# Patient Record
Sex: FEMALE | Race: WHITE
[De-identification: names, ages, dates, MRNs, and addresses within clinical notes are randomized per-mention and may not be internally consistent; named-entity substitution may affect disease eponyms.]

---

## 2018-06-01 ENCOUNTER — HOSPITAL ENCOUNTER (OUTPATIENT)
Dept: HOSPITAL 17 - CPRE | Age: 38
End: 2018-06-01
Attending: OBSTETRICS & GYNECOLOGY
Payer: COMMERCIAL

## 2018-06-01 DIAGNOSIS — N80.9: ICD-10-CM

## 2018-06-01 DIAGNOSIS — Z01.812: Primary | ICD-10-CM

## 2018-06-01 DIAGNOSIS — R10.2: ICD-10-CM

## 2018-06-01 LAB
BACTERIA #/AREA URNS HPF: (no result) /HPF
BASOPHILS # BLD AUTO: 0 TH/MM3 (ref 0–0.2)
BASOPHILS NFR BLD: 0.2 % (ref 0–2)
CHLORIDE SERPL-SCNC: 104 MEQ/L (ref 98–107)
COLOR UR: YELLOW
EOSINOPHIL # BLD: 0.3 TH/MM3 (ref 0–0.4)
EOSINOPHIL NFR BLD: 2 % (ref 0–4)
ERYTHROCYTE [DISTWIDTH] IN BLOOD BY AUTOMATED COUNT: 14.1 % (ref 11.6–17.2)
GLUCOSE UR STRIP-MCNC: (no result) MG/DL
HCO3 BLD-SCNC: 27.4 MEQ/L (ref 21–32)
HCT VFR BLD CALC: 39.2 % (ref 35–46)
HGB BLD-MCNC: 12.8 GM/DL (ref 11.6–15.3)
HGB UR QL STRIP: (no result)
KETONES UR STRIP-MCNC: (no result) MG/DL
LYMPHOCYTES # BLD AUTO: 4.7 TH/MM3 (ref 1–4.8)
LYMPHOCYTES NFR BLD AUTO: 31.8 % (ref 9–44)
MCH RBC QN AUTO: 28.2 PG (ref 27–34)
MCHC RBC AUTO-ENTMCNC: 32.7 % (ref 32–36)
MCV RBC AUTO: 86.3 FL (ref 80–100)
MONOCYTE #: 0.7 TH/MM3 (ref 0–0.9)
MONOCYTES NFR BLD: 4.7 % (ref 0–8)
MUCOUS THREADS #/AREA URNS LPF: (no result) /LPF
NEUTROPHILS # BLD AUTO: 9.1 TH/MM3 (ref 1.8–7.7)
NEUTROPHILS NFR BLD AUTO: 61.3 % (ref 16–70)
NITRITE UR QL STRIP: (no result)
PLATELET # BLD: 438 TH/MM3 (ref 150–450)
PMV BLD AUTO: 6.6 FL (ref 7–11)
RBC # BLD AUTO: 4.54 MIL/MM3 (ref 4–5.3)
SODIUM SERPL-SCNC: 140 MEQ/L (ref 136–145)
SP GR UR STRIP: 1.03 (ref 1–1.03)
SQUAMOUS #/AREA URNS HPF: 21 /HPF (ref 0–5)
URINE LEUKOCYTE ESTERASE: (no result)
WBC # BLD AUTO: 14.9 TH/MM3 (ref 4–11)

## 2018-06-01 PROCEDURE — 85025 COMPLETE CBC W/AUTO DIFF WBC: CPT

## 2018-06-01 PROCEDURE — 81001 URINALYSIS AUTO W/SCOPE: CPT

## 2018-06-01 PROCEDURE — 36415 COLL VENOUS BLD VENIPUNCTURE: CPT

## 2018-06-01 PROCEDURE — 80051 ELECTROLYTE PANEL: CPT

## 2018-06-06 ENCOUNTER — HOSPITAL ENCOUNTER (OUTPATIENT)
Dept: HOSPITAL 17 - HSDC | Age: 38
Discharge: HOME | End: 2018-06-06
Attending: OBSTETRICS & GYNECOLOGY
Payer: COMMERCIAL

## 2018-06-06 VITALS
DIASTOLIC BLOOD PRESSURE: 65 MMHG | OXYGEN SATURATION: 97 % | TEMPERATURE: 96.7 F | RESPIRATION RATE: 16 BRPM | HEART RATE: 98 BPM | SYSTOLIC BLOOD PRESSURE: 104 MMHG

## 2018-06-06 VITALS — BODY MASS INDEX: 24.89 KG/M2 | WEIGHT: 164.24 LBS | HEIGHT: 68 IN

## 2018-06-06 DIAGNOSIS — G89.29: ICD-10-CM

## 2018-06-06 DIAGNOSIS — N80.0: Primary | ICD-10-CM

## 2018-06-06 DIAGNOSIS — N83.12: ICD-10-CM

## 2018-06-06 DIAGNOSIS — N83.8: ICD-10-CM

## 2018-06-06 DIAGNOSIS — N94.6: ICD-10-CM

## 2018-06-06 DIAGNOSIS — R10.2: ICD-10-CM

## 2018-06-06 PROCEDURE — 00840 ANES IPER PX LOWER ABD NOS: CPT

## 2018-06-06 PROCEDURE — 58552 LAPARO-VAG HYST INCL T/O: CPT

## 2018-06-06 PROCEDURE — 88307 TISSUE EXAM BY PATHOLOGIST: CPT

## 2018-06-06 NOTE — MP
cc:

Gerard Batista MD

****

 

 

DATE OF OPERATION:

06/06/2018

 

DATE OF PROCEDURE:

06/06/2018.

 

PREOPERATIVE DIAGNOSES:

The patient with chronic pelvic pain, dysmenorrhea, history of 

endometriosis.

 

PROCEDURE PERFORMED:

Operative laparoscopy with robotic-assisted total laparoscopic 

hysterectomy, bilateral salpingectomy, left oophorectomy, lysis of 

adhesions.

 

POSTOPERATIVE DIAGNOSIS.

Chronic pelvic pain, dysmenorrhea, history of endometriosis.

 

SURGEON:

Gerard Batista MD

 

ANESTHESIA:

General by endotracheal intubation.

 

ESTIMATED BLOOD LOSS:

Less than 50 mL.

 

DRAINS:

Giron to gravity.

 

OPERATIVE FINDINGS:

The patient had a normal-sized uterus that was mobile.  There was 

evidence of peritoneal implants over the anterior peritoneal surface 

and in the cul-de-sac and under the left uterosacral ligament.  The 

left ovary was adherent to the sidewall.  The right ovary was normal 

in size, shape and appearance and free and devoid of any surface 

lesions.

 

INDICATIONS FOR PROCEDURE:

The patient with chronic debilitating pelvic pain secondary to 

endometriosis, failed conservative management.  After discussion of 

therapeutic options, the patient elected for hysterectomy, also 

intraoperative evaluation of the left ovary.  The patient's pain is 

predominantly on the left side and the patient elected to have her 

left ovary removed at the time of her procedure.  The patient received

Ancef 2 grams prophylactically.

 

PROCEDURE DESCRIPTION:

The patient was taken to the operating room under stable condition, 

underwent general anesthesia with endotracheal intubation.  She was 

carefully positioned in dorsal lithotomy position using Kristopher stirrups

on the lower extremities.  She had sequentials placed for VTE 

prophylaxis.  She was prepped and draped.  Timeout was conducted and 

agreed on by all present in the room.  A Giron catheter was inserted 

by sterile technique.  The patient was then examined.  The cervix was 

small.  A medium VCare device was utilized and inserted through the 

cervical os without difficulty.  The cervix was dilated to accommodate

the VCare without complication or perforation.  After securing the 

VCare, all instruments were removed and gloves were changed.  The 

abdomen was examined.  The patient had a large tattoo below the 

umbilicus extending to the pubic symphysis and to each right and left 

flank.  Otherwise, no discernible pathology or lesions were noted.

 

The umbilical port site was chosen first injecting with 0.25% plain 

Marcaine and then using a #5 trocar placed through a small incision 

with direct entry into the peritoneal cavity without complication.  

Insufflation of CO2 was conducted at low pressure and then the patient

was placed in Trendelenburg positioning to visualize the pelvic 

anatomy.  The laparoscopic ports were placed using 8 mm da Kavin 

ports, placing them strategically to avoid interruption of the tattoo.

 These were placed under direct vision, again injecting the incision 

site with 0.25% plain Marcaine.  The umbilical port was then traded to

a #12 camera port.  The da Kavin patient's cart was side-docked with 

the #2 arm on the left and the #1 and #3 on the right.  Monopolar 

scissors were attached to two and a bipolar fenestrated grasper and a 

ProGrasp were placed in the #1 and #3 respectively.  Good articulation

was noted.  There were no collisions.  Attention was then directed to 

the surgeon's cart where visualization of the pelvic anatomy was done.

 After confirming abnormalities of the left ovary and cul-de-sac, 

decision was to proceed with removal of the left tube and ovary.

 

The round ligament was divided on the left, allowing dissection of the

retroperitoneal space and then opening the broad ligament to the 

contralateral side over the bladder reflection and then laterally to 

evaluate the infundibulopelvic vessels.  The ureter was easily 

visualized and skeletonized out of the operative field.  The 

infundibulopelvic vessels were then secured hemostatically.  Pedicle 

was cut and then dissection of the uterine artery and vein on the left

side was accomplished without difficulty.  Good hemostasis was 

secured.  After securing the pedicle the contralateral side was then 

dissected and then decision was to leave the right ovary.  The 

fallopian tube was removed on the right, cauterizing the mesentery 

appropriately to control any bleeding and then this allowed removal of

the fallopian tube intact with the uterus.  The uteroovarian pedicle 

was skeletonized, ligated opening the broad ligament leaf posteriorly 

and anteriorly, identifying the uterine artery and vein on the right 

was accomplished without difficulty and then the pedicle secured and 

made hemostatic with bipolar energy.  Once the vessels were secured, a

colpotomy incision was made posteriorly allowing removal of the cervix

with the uterus after completing the circumferential colpotomy 

incision.  Good result was noted.  No active bleeding or hematoma was 

incurred.  The uterus was then retrieved and removed through the 

vaginal opening by the assistant and this included both fallopian 

tubes, left ovary and cervix with the uterus.

 

The vaginal cuff was then closed with a number 1 Stratafix suture, 

which was introduced through the vaginal opening.  The #2 arm was 

traded to a Gurdeep-Cut suture needle  and then closure of the cuff 

was accomplished without complication with good integrity demonstrated

by the assistant after placing a sponge stick vaginally and providing 

pressure in a cephalad direction.  Once this was complete, the suture 

needle was trimmed flush with the peritoneum and the suture needle was

secured.  In addition to placement of the #1 Stratafix suture, a 2-0 

Vicryl suture was introduced, securing the right ovary to the 

sidewall.  The ovary had a very prominent pedicle and concerns for 

torsion were noted.  The ovary was secured to the round ligament with 

a simple figure-of-eight suture of the 2-0 Vicryl with good result.  

No bleeding, no hematoma was incurred.  After trimming the suture ends

and securing the needle, observation of the dissection sites were dry 

and at this point, the da Kavin patient's cart was undocked.  Straight

laparoscopy was used to retrieve the suture needles and then 

irrigation of the pelvis was accomplished without difficulty.  Copious

irrigation was used to clean the pelvis.  Observation off pressure 

revealed no active bleeding or hematoma.  Both ureters were visualized

throughout and peristalsing normally, draining clear urine into the 

Giron bag.

 

After completion of the procedure, the umbilical port site was closed 

using a CrossBow with a #1 Vicryl suture, closing the fascia with good

integrity.  At the completion of the case, the pneumoperitoneum was 

decompressed under direct vision.  The trocars were all removed 

without complication.  Additional 0.25% plain Marcaine was injected 

into all incision sites for postoperative pain management.  A 4-0 

Monocryl suture was used to close the skin incisions with subcuticular

method and then Steri-Strips and Band-Aids were placed over the 

incision.  At the completion of the case, final counts were correct.  

The patient was stable.  She was taken to the recovery room on room 

air.

 

 

__________________________________

MD LINO Sosa/VIKI

D: 06/06/2018, 10:00 AM

T: 06/06/2018, 10:31 AM

Visit #: [id number]

Job #: [id number]

## 2018-06-09 ENCOUNTER — HOSPITAL ENCOUNTER (INPATIENT)
Dept: HOSPITAL 17 - NEPE | Age: 38
LOS: 2 days | Discharge: HOME | DRG: 863 | End: 2018-06-11
Attending: OBSTETRICS & GYNECOLOGY | Admitting: OBSTETRICS & GYNECOLOGY
Payer: COMMERCIAL

## 2018-06-09 VITALS
OXYGEN SATURATION: 98 % | RESPIRATION RATE: 20 BRPM | HEART RATE: 112 BPM | SYSTOLIC BLOOD PRESSURE: 96 MMHG | TEMPERATURE: 98.7 F | DIASTOLIC BLOOD PRESSURE: 51 MMHG

## 2018-06-09 VITALS
OXYGEN SATURATION: 99 % | DIASTOLIC BLOOD PRESSURE: 78 MMHG | SYSTOLIC BLOOD PRESSURE: 137 MMHG | HEART RATE: 99 BPM | RESPIRATION RATE: 17 BRPM

## 2018-06-09 VITALS
HEART RATE: 115 BPM | OXYGEN SATURATION: 98 % | SYSTOLIC BLOOD PRESSURE: 117 MMHG | RESPIRATION RATE: 18 BRPM | DIASTOLIC BLOOD PRESSURE: 59 MMHG | TEMPERATURE: 99.2 F

## 2018-06-09 VITALS
RESPIRATION RATE: 18 BRPM | OXYGEN SATURATION: 99 % | HEART RATE: 98 BPM | SYSTOLIC BLOOD PRESSURE: 127 MMHG | DIASTOLIC BLOOD PRESSURE: 62 MMHG

## 2018-06-09 VITALS — TEMPERATURE: 97.3 F

## 2018-06-09 VITALS
HEART RATE: 90 BPM | DIASTOLIC BLOOD PRESSURE: 81 MMHG | OXYGEN SATURATION: 96 % | SYSTOLIC BLOOD PRESSURE: 114 MMHG | RESPIRATION RATE: 18 BRPM | TEMPERATURE: 99 F

## 2018-06-09 VITALS
HEART RATE: 93 BPM | DIASTOLIC BLOOD PRESSURE: 51 MMHG | OXYGEN SATURATION: 97 % | TEMPERATURE: 98.6 F | RESPIRATION RATE: 17 BRPM | SYSTOLIC BLOOD PRESSURE: 105 MMHG

## 2018-06-09 DIAGNOSIS — N73.0: ICD-10-CM

## 2018-06-09 DIAGNOSIS — Z72.0: ICD-10-CM

## 2018-06-09 DIAGNOSIS — Z90.710: ICD-10-CM

## 2018-06-09 DIAGNOSIS — K59.00: ICD-10-CM

## 2018-06-09 DIAGNOSIS — T81.4XXA: Primary | ICD-10-CM

## 2018-06-09 DIAGNOSIS — F31.9: ICD-10-CM

## 2018-06-09 LAB
ALBUMIN SERPL-MCNC: 3 GM/DL (ref 3.4–5)
ALP SERPL-CCNC: 87 U/L (ref 45–117)
ALT SERPL-CCNC: 33 U/L (ref 10–53)
AST SERPL-CCNC: 15 U/L (ref 15–37)
BASOPHILS # BLD AUTO: 0 TH/MM3 (ref 0–0.2)
BASOPHILS NFR BLD: 0.1 % (ref 0–2)
BILIRUB SERPL-MCNC: 0.7 MG/DL (ref 0.2–1)
BUN SERPL-MCNC: 8 MG/DL (ref 7–18)
CALCIUM SERPL-MCNC: 8.4 MG/DL (ref 8.5–10.1)
CHLORIDE SERPL-SCNC: 101 MEQ/L (ref 98–107)
COLOR UR: YELLOW
CREAT SERPL-MCNC: 0.58 MG/DL (ref 0.5–1)
EOSINOPHIL # BLD: 0.1 TH/MM3 (ref 0–0.4)
EOSINOPHIL NFR BLD: 0.5 % (ref 0–4)
ERYTHROCYTE [DISTWIDTH] IN BLOOD BY AUTOMATED COUNT: 14.1 % (ref 11.6–17.2)
GFR SERPLBLD BASED ON 1.73 SQ M-ARVRAT: 118 ML/MIN (ref 89–?)
GLUCOSE SERPL-MCNC: 96 MG/DL (ref 74–106)
GLUCOSE UR STRIP-MCNC: (no result) MG/DL
HCO3 BLD-SCNC: 25.2 MEQ/L (ref 21–32)
HCT VFR BLD CALC: 33.8 % (ref 35–46)
HGB BLD-MCNC: 11 GM/DL (ref 11.6–15.3)
HGB UR QL STRIP: (no result)
INR PPP: 1 RATIO
KETONES UR STRIP-MCNC: (no result) MG/DL
LYMPHOCYTES # BLD AUTO: 1.3 TH/MM3 (ref 1–4.8)
LYMPHOCYTES NFR BLD AUTO: 8 % (ref 9–44)
MCH RBC QN AUTO: 28.1 PG (ref 27–34)
MCHC RBC AUTO-ENTMCNC: 32.5 % (ref 32–36)
MCV RBC AUTO: 86.5 FL (ref 80–100)
MONOCYTE #: 0.9 TH/MM3 (ref 0–0.9)
MONOCYTES NFR BLD: 5.4 % (ref 0–8)
MUCOUS THREADS #/AREA URNS LPF: (no result) /LPF
NEUTROPHILS # BLD AUTO: 14 TH/MM3 (ref 1.8–7.7)
NEUTROPHILS NFR BLD AUTO: 86 % (ref 16–70)
NITRITE UR QL STRIP: (no result)
PLATELET # BLD: 353 TH/MM3 (ref 150–450)
PMV BLD AUTO: 7.2 FL (ref 7–11)
PROT SERPL-MCNC: 7 GM/DL (ref 6.4–8.2)
PROTHROMBIN TIME: 10 SEC (ref 9.8–11.6)
RBC # BLD AUTO: 3.91 MIL/MM3 (ref 4–5.3)
SODIUM SERPL-SCNC: 137 MEQ/L (ref 136–145)
SP GR UR STRIP: 1.02 (ref 1–1.03)
SQUAMOUS #/AREA URNS HPF: 2 /HPF (ref 0–5)
URINE LEUKOCYTE ESTERASE: (no result)
WBC # BLD AUTO: 16.3 TH/MM3 (ref 4–11)

## 2018-06-09 PROCEDURE — 96365 THER/PROPH/DIAG IV INF INIT: CPT

## 2018-06-09 PROCEDURE — 86900 BLOOD TYPING SEROLOGIC ABO: CPT

## 2018-06-09 PROCEDURE — 85730 THROMBOPLASTIN TIME PARTIAL: CPT

## 2018-06-09 PROCEDURE — 81001 URINALYSIS AUTO W/SCOPE: CPT

## 2018-06-09 PROCEDURE — 85610 PROTHROMBIN TIME: CPT

## 2018-06-09 PROCEDURE — 86850 RBC ANTIBODY SCREEN: CPT

## 2018-06-09 PROCEDURE — 80053 COMPREHEN METABOLIC PANEL: CPT

## 2018-06-09 PROCEDURE — 76856 US EXAM PELVIC COMPLETE: CPT

## 2018-06-09 PROCEDURE — 96376 TX/PRO/DX INJ SAME DRUG ADON: CPT

## 2018-06-09 PROCEDURE — 86901 BLOOD TYPING SEROLOGIC RH(D): CPT

## 2018-06-09 PROCEDURE — 87040 BLOOD CULTURE FOR BACTERIA: CPT

## 2018-06-09 PROCEDURE — 85025 COMPLETE CBC W/AUTO DIFF WBC: CPT

## 2018-06-09 PROCEDURE — G0378 HOSPITAL OBSERVATION PER HR: HCPCS

## 2018-06-09 PROCEDURE — 96361 HYDRATE IV INFUSION ADD-ON: CPT

## 2018-06-09 PROCEDURE — 96375 TX/PRO/DX INJ NEW DRUG ADDON: CPT

## 2018-06-09 PROCEDURE — 83605 ASSAY OF LACTIC ACID: CPT

## 2018-06-09 PROCEDURE — 74177 CT ABD & PELVIS W/CONTRAST: CPT

## 2018-06-09 RX ADMIN — DOCUSATE SODIUM SCH MG: 100 CAPSULE, LIQUID FILLED ORAL at 10:56

## 2018-06-09 RX ADMIN — OXYTOCIN SCH MLS/HR: 10 INJECTION, SOLUTION INTRAMUSCULAR; INTRAVENOUS at 16:23

## 2018-06-09 RX ADMIN — TAZOBACTAM SODIUM AND PIPERACILLIN SODIUM SCH MLS/HR: 500; 4 INJECTION, SOLUTION INTRAVENOUS at 16:23

## 2018-06-09 RX ADMIN — ONDANSETRON PRN MG: 4 TABLET, ORALLY DISINTEGRATING ORAL at 13:57

## 2018-06-09 RX ADMIN — DOCUSATE SODIUM SCH MG: 100 CAPSULE, LIQUID FILLED ORAL at 20:05

## 2018-06-09 RX ADMIN — Medication SCH ML: at 20:04

## 2018-06-09 RX ADMIN — OXYCODONE HYDROCHLORIDE AND ACETAMINOPHEN PRN TAB: 5; 325 TABLET ORAL at 13:55

## 2018-06-09 RX ADMIN — ONDANSETRON PRN MG: 4 TABLET, ORALLY DISINTEGRATING ORAL at 20:05

## 2018-06-09 RX ADMIN — OXYTOCIN SCH MLS/HR: 10 INJECTION, SOLUTION INTRAMUSCULAR; INTRAVENOUS at 10:57

## 2018-06-09 RX ADMIN — OXYCODONE HYDROCHLORIDE AND ACETAMINOPHEN PRN TAB: 5; 325 TABLET ORAL at 20:57

## 2018-06-09 NOTE — HHI.PR
Subjective


Remarks


36 yo wf P0  POD 3 s/p robotic TLH LSO


to ED with complaint of aaabdominal pain 8/10 and fever with sweats and chills


work up in [place] suggests small abcess in RLQ


has not yet moved bowels


appetite OK


voiding normally





Objective


Vital Signs





Vital Signs








  Date Time  Temp Pulse Resp B/P (MAP) Pulse Ox O2 Delivery O2 Flow Rate FiO2


 


6/9/18 09:34  99 17 137/78 (97) 99 Room Air  


 


6/9/18 07:41     99 Room Air  


 


6/9/18 07:41  98 18 127/62 (83) 99 Room Air  


 


6/9/18 07:27   18     


 


6/9/18 06:43 99.2 115 18 117/59 (78) 98   








Result Diagram:  


6/9/18 0725                                                                    

            6/9/18 0725





Objective Remarks


Chest is clear, regular rate and rhythm.


Abdomen is soft and diffusely tender without guarding


Incisions clean and dry.


no perineal bleeding


Ext no CCE.





A/P


Assessment and Plan


POD 3





small abcess suggested on CT


will keep 48 hours on IV antibiotics and see if resolved without Lscope or IR


supportive care











Elisa Alvarez MD Jun 9, 2018 10:30

## 2018-06-09 NOTE — PD
HPI


Chief Complaint:  Pain: Acute or Chronic


Time Seen by Provider:  06:54


Travel History


International Travel<30 days:  No


Contact w/Intl Traveler<30days:  No


Traveled to known affect area:  No





History of Present Illness


HPI


The patient is a 37-year-old female who presents to the emergency department 

for lower abdominal pain that radiates to the back.  The patient is status post 

partial hysterectomy removal of her cervix, uterus, fallopian tube, and left 

ovary by Dr. Batista last Wednesday.  The patient states she developed lower 

abdominal pain that radiates to the back and upper abdomen last night at 

midnight.  The patient called the on-call physician who referred her to the 

emergency department for laboratory evaluation and imaging.  She does note 

subjective fever at home with chills and sweats.  She does note some 

postoperative vaginal spotting, which she was told would be normal after the 

surgery.  The patient had a laparoscopic procedure performed and also states 

she has stitches within the vagina.  Symptoms are moderate.  There are no 

current alleviating factors.  She does note mild nausea without any vomiting.  

She does complain of mild discomfort with urination but denies any actual 

dysuria or hematuria.





PFSH


Past Medical History


Cancer:  No


Cardiovascular Problems:  No


Diabetes:  No


Endocrine:  No


Genitourinary:  No


Hepatitis:  No


Hiatal Hernia:  No


Immune Disorder:  No


Musculoskeletal:  No


Neurologic:  No


Psychiatric:  Yes (BIPOLAR)


Reproductive:  Yes


Respiratory:  No


Thyroid Disease:  No


Pregnant?:  Not Pregnant





Past Surgical History


Abdominal Surgery:  Yes


AICD:  No


Gynecologic Surgery:  Yes


Hysterectomy:  Yes


Joint Replacement:  No


Pacemaker:  No


Other Surgery:  Yes





Social History


Alcohol Use:  Yes


Tobacco Use:  Yes


Substance Use:  No





Allergies-Medications


(Allergen,Severity, Reaction):  


Coded Allergies:  


     No Known Allergies (Unverified , 6/9/18)


Reported Meds & Prescriptions





Reported Meds & Active Scripts


Active


Norco (Hydrocodone-Acetaminophen) 7.5-325 mg Tab 1 Tab PO Q6H PRN 7 Days


Reported


Klonopin (Clonazepam) 0.5 Mg Tab 0.5 Mg PO DAILY


Lamictal (Lamotrigine) 150 Mg Tab 150 Mg PO DAILY


Effexor XR 24 HR (Venlafaxine HCl) 150 Mg Cap 150 Mg PO DAILY








Review of Systems


Except as stated in HPI:  all other systems reviewed are Neg


General / Constitutional:  Positive: Fever, Chills


Cardiovascular:  No: Chest Pain or Discomfort


Respiratory:  No: Shortness of Breath


Gastrointestinal:  Positive: Nausea, Abdominal Pain, No: Vomiting, Diarrhea


Genitourinary:  Positive: Pelvic Pain, Vaginal Bleeding (Spotting), No: Urgency

, Frequency, Dysuria, Discharge





Physical Exam


Narrative


GENERAL: Awake, alert, pleasant 37-year-old female who appears her stated age 

and is in no acute respiratory distress.


SKIN: Focused skin assessment warm/dry.  Tattoo noted over the anterior aspect 

of the abdomen.


HEAD: Atraumatic. Normocephalic. 


EYES: Pupils equal and round. No scleral icterus. No injection or drainage. 


ENT: No nasal bleeding or discharge.  Mucous membranes pink and moist.


NECK: Trachea midline. No JVD. 


CARDIOVASCULAR: Regular, tachycardic with a heart rate of 115.


RESPIRATORY: No accessory muscle use. Clear to auscultation. Breath sounds 

equal bilaterally. 


GASTROINTESTINAL: Abdomen soft, diffuse tenderness with mild guarding.  No 

rigidity.  4 laparoscopic incisions noted with Steri-Strips in place.


Back: Mild bilateral CVA tenderness. 


MUSCULOSKELETAL: No obvious deformities. No clubbing.  No cyanosis.  No edema. 


NEUROLOGICAL: Awake and alert. No obvious cranial nerve deficits.  Motor 

grossly within normal limits. Normal speech.


PSYCHIATRIC: Appropriate mood and affect; insight and judgment normal.





Data


Data


Last Documented VS





Vital Signs








  Date Time  Temp Pulse Resp B/P (MAP) Pulse Ox O2 Delivery O2 Flow Rate FiO2


 


6/9/18 07:41     99 Room Air  


 


6/9/18 07:41  98 18 127/62 (83)    


 


6/9/18 06:43 99.2       








Orders





 Orders


Complete Blood Count With Diff (6/9/18 07:09)


Comprehensive Metabolic Panel (6/9/18 07:09)


Lactic Acid (6/9/18 07:09)


Prothrombin Time / Inr (Pt) (6/9/18 07:09)


Act Partial Throm Time (Ptt) (6/9/18 07:09)


Urinalysis - C+S If Indicated (6/9/18 07:09)


Ct Abd/Pel W Iv Contrast(Rout) (6/9/18 07:09)


Iv Access Insert/Monitor (6/9/18 07:09)


Ecg Monitoring (6/9/18 07:09)


Oximetry (6/9/18 07:09)


Morphine Inj (Morphine Inj) (6/9/18 07:15)


Sodium Chlor 0.9% 1000 Ml Inj (Ns 1000 M (6/9/18 07:09)


Sodium Chloride 0.9% Flush (Ns Flush) (6/9/18 07:15)


Ondansetron  Odt (Zofran  Odt) (6/9/18 07:15)


Type And Screen (6/9/18 07:09)


Iohexol 350 Inj (Omnipaque 350 Inj) (6/9/18 06:41)


Piperacil-Tazo 4.5 Gm Premix (Zosyn 4.5 (6/9/18 08:45)


Blood Culture (6/9/18 08:34)


Sodium Chlor 0.9% 1000 Ml Inj (Ns 1000 M (6/9/18 08:45)


Sodium Chlor 0.9% 1000 Ml Inj (Ns 1000 M (6/9/18 08:45)


Ketorolac Inj (Toradol Inj) (6/9/18 08:45)


Morphine Inj (Morphine Inj) (6/9/18 08:45)





Labs





Laboratory Tests








Test


  6/9/18


07:25


 


White Blood Count 16.3 TH/MM3 


 


Red Blood Count 3.91 MIL/MM3 


 


Hemoglobin 11.0 GM/DL 


 


Hematocrit 33.8 % 


 


Mean Corpuscular Volume 86.5 FL 


 


Mean Corpuscular Hemoglobin 28.1 PG 


 


Mean Corpuscular Hemoglobin


Concent 32.5 % 


 


 


Red Cell Distribution Width 14.1 % 


 


Platelet Count 353 TH/MM3 


 


Mean Platelet Volume 7.2 FL 


 


Neutrophils (%) (Auto) 86.0 % 


 


Lymphocytes (%) (Auto) 8.0 % 


 


Monocytes (%) (Auto) 5.4 % 


 


Eosinophils (%) (Auto) 0.5 % 


 


Basophils (%) (Auto) 0.1 % 


 


Neutrophils # (Auto) 14.0 TH/MM3 


 


Lymphocytes # (Auto) 1.3 TH/MM3 


 


Monocytes # (Auto) 0.9 TH/MM3 


 


Eosinophils # (Auto) 0.1 TH/MM3 


 


Basophils # (Auto) 0.0 TH/MM3 


 


CBC Comment DIFF FINAL 


 


Differential Comment  


 


Prothrombin Time 10.0 SEC 


 


Prothromb Time International


Ratio 1.0 RATIO 


 


 


Activated Partial


Thromboplast Time 32.4 SEC 


 


 


Urine Color YELLOW 


 


Urine Turbidity CLEAR 


 


Urine pH 6.5 


 


Urine Specific Gravity 1.020 


 


Urine Protein TRACE mg/dL 


 


Urine Glucose (UA) NEG mg/dL 


 


Urine Ketones NEG mg/dL 


 


Urine Occult Blood TRACE 


 


Urine Nitrite NEG 


 


Urine Bilirubin NEG 


 


Urine Urobilinogen 4.0 MG/DL 


 


Urine Leukocyte Esterase NEG 


 


Urine RBC 6 /hpf 


 


Urine WBC 1 /hpf 


 


Urine Squamous Epithelial


Cells 2 /hpf 


 


 


Urine Mucus FEW /lpf 


 


Microscopic Urinalysis Comment


  CULT NOT


INDICATED


 


Blood Urea Nitrogen 8 MG/DL 


 


Creatinine 0.58 MG/DL 


 


Random Glucose 96 MG/DL 


 


Total Protein 7.0 GM/DL 


 


Albumin 3.0 GM/DL 


 


Calcium Level 8.4 MG/DL 


 


Alkaline Phosphatase 87 U/L 


 


Aspartate Amino Transf


(AST/SGOT) 15 U/L 


 


 


Alanine Aminotransferase


(ALT/SGPT) 33 U/L 


 


 


Total Bilirubin 0.7 MG/DL 


 


Sodium Level 137 MEQ/L 


 


Potassium Level 3.6 MEQ/L 


 


Chloride Level 101 MEQ/L 


 


Carbon Dioxide Level 25.2 MEQ/L 


 


Anion Gap 11 MEQ/L 


 


Estimat Glomerular Filtration


Rate 118 ML/MIN 


 


 


Lactic Acid Level 0.5 mmol/L 











MDM


Medical Decision Making


Medical Screen Exam Complete:  Yes


Emergency Medical Condition:  Yes


Medical Record Reviewed:  Yes


Interpretation(s)





Laboratory Tests








Test


  6/9/18


07:25


 


White Blood Count 16.3 TH/MM3 


 


Red Blood Count 3.91 MIL/MM3 


 


Hemoglobin 11.0 GM/DL 


 


Hematocrit 33.8 % 


 


Mean Corpuscular Volume 86.5 FL 


 


Mean Corpuscular Hemoglobin 28.1 PG 


 


Mean Corpuscular Hemoglobin


Concent 32.5 % 


 


 


Red Cell Distribution Width 14.1 % 


 


Platelet Count 353 TH/MM3 


 


Mean Platelet Volume 7.2 FL 


 


Neutrophils (%) (Auto) 86.0 % 


 


Lymphocytes (%) (Auto) 8.0 % 


 


Monocytes (%) (Auto) 5.4 % 


 


Eosinophils (%) (Auto) 0.5 % 


 


Basophils (%) (Auto) 0.1 % 


 


Neutrophils # (Auto) 14.0 TH/MM3 


 


Lymphocytes # (Auto) 1.3 TH/MM3 


 


Monocytes # (Auto) 0.9 TH/MM3 


 


Eosinophils # (Auto) 0.1 TH/MM3 


 


Basophils # (Auto) 0.0 TH/MM3 


 


CBC Comment DIFF FINAL 


 


Differential Comment  


 


Prothrombin Time 10.0 SEC 


 


Prothromb Time International


Ratio 1.0 RATIO 


 


 


Activated Partial


Thromboplast Time 32.4 SEC 


 


 


Urine Color YELLOW 


 


Urine Turbidity CLEAR 


 


Urine pH 6.5 


 


Urine Specific Gravity 1.020 


 


Urine Protein TRACE mg/dL 


 


Urine Glucose (UA) NEG mg/dL 


 


Urine Ketones NEG mg/dL 


 


Urine Occult Blood TRACE 


 


Urine Nitrite NEG 


 


Urine Bilirubin NEG 


 


Urine Urobilinogen 4.0 MG/DL 


 


Urine Leukocyte Esterase NEG 


 


Urine RBC 6 /hpf 


 


Urine WBC 1 /hpf 


 


Urine Squamous Epithelial


Cells 2 /hpf 


 


 


Urine Mucus FEW /lpf 


 


Microscopic Urinalysis Comment


  CULT NOT


INDICATED


 


Blood Urea Nitrogen 8 MG/DL 


 


Creatinine 0.58 MG/DL 


 


Random Glucose 96 MG/DL 


 


Total Protein 7.0 GM/DL 


 


Albumin 3.0 GM/DL 


 


Calcium Level 8.4 MG/DL 


 


Alkaline Phosphatase 87 U/L 


 


Aspartate Amino Transf


(AST/SGOT) 15 U/L 


 


 


Alanine Aminotransferase


(ALT/SGPT) 33 U/L 


 


 


Total Bilirubin 0.7 MG/DL 


 


Sodium Level 137 MEQ/L 


 


Potassium Level 3.6 MEQ/L 


 


Chloride Level 101 MEQ/L 


 


Carbon Dioxide Level 25.2 MEQ/L 


 


Anion Gap 11 MEQ/L 


 


Estimat Glomerular Filtration


Rate 118 ML/MIN 


 


 


Lactic Acid Level 0.5 mmol/L 








Last Impressions








Abdomen/Pelvis CT 6/9/18 0709 Signed





Impressions: 





 CONCLUSION:





 1.  Scattered foci of free air in the lower abdomen and pelvis not unexpected g





 iven the recent hysterectomy.





 2.  There is moderate stranding, a small amount of free fluid as well as a rim-





 enhancing fluid collection in the right hemipelvis measuring up to 4.5 cm jessenia





 rning for developing abscess.





 3.  Reactive bowel wall thickening is seen involving a loop of ileum in the rig





 ht hemipelvis. There is also mild circumferential bladder wall thickening.





  





 








Differential Diagnosis


Differential diagnosis includes postoperative hematoma, seroma, abscess, UTI, 

postoperative bleeding, symptomatic anemia, postoperative infection.


Narrative Course


IV was established, labs are drawn and sent, the patient was placed on cardiac 

telemetry monitoring and continuous pulse oximetry monitoring.  The patient was 

administer morphine, Zofran, and IV fluids.  UA was ordered.  CT of the abdomen 

and pelvis with IV contrast was ordered.  The patient's white count is elevated 

at 16.3.  Hemoglobin was 11.0.  UA reveals 6 RBCs, otherwise unremarkable.  

Lactic acid is normal at 0.5.  CT the abdomen and pelvis reveals possible early 

postoperative abscess measuring 4.5 cm with some stranding of the surrounding 

area.  Therefore, the patient was covered with Zosyn 4.5 g intravenously.  The 

patient was reevaluated, her pain had improved to a 5/10.  Therefore, the 

patient was administered Toradol 30 mg intravenously and morphine 2 mg 

intravenously.  A call was placed to Dr. Batista, I discussed the patient with 

the on-call physician, Dr. Alvarez who agrees with IV antibiotics and 

admission.





Sepsis Criteria


SIRS Criteria (2 or more):  Heart rate over 90, WBC > 49199, < 4000 or > 10% 

bands


Sepsis Criteria (SIRS+source):  Infect source susp/known


Criteria Outcome:  Meets sepsis criteria





Physician Communication


Physician Communication


I discussed the patient with Dr. Alvarez who agrees with admission.





Diagnosis





 Primary Impression:  


 Postoperative abscess


 Qualified Codes:  T81.4XXA - Infection following a procedure, initial encounter


 Additional Impression:  


 Sepsis


 Qualified Codes:  A41.9 - Sepsis, unspecified organism





Admitting Information


Admitting Physician Requests:  Admit


Condition:  Stable











Marlon Palm MD Jun 9, 2018 07:14

## 2018-06-09 NOTE — RADRPT
EXAM DATE:  6/9/2018 8:19 AM EDT

AGE/SEX:        37 years / Female



INDICATIONS:  Postoperative abdominal pain that radiates to the back; hysterectomy three days ago.



CLINICAL DATA:  This is the patient's initial encounter. Patient reports that signs and symptoms have
 been present for 3 days and indicates a pain score of 8/10. 

                                                                          

MEDICAL/SURGICAL HISTORY:       . Bipolar disorder.  Hysterectomy.



ORAL CONTRAST:   No oral contrast ingested.



RADIATION DOSE:  7.22 CTDI (mGy)









COMPARISON:      No prior exams available for comparison. 





TECHNIQUE:  Multiple contiguous axial images were obtained through the abdomen and pelvis following b
olus infusion of  96 ml Omnipaque 350 (iohexol)  nonionic water-soluble contrast as a single exam dos
e. No oral contrast ingested.  Using automated exposure control and adjustment of the mA and/or kV ac
cording to patient size, the radiation dose was kept as low as reasonably achievable to obtain optima
l diagnostic quality images.



FINDINGS: 

Lung bases are clear. Osseous structures are intact. No pleural or pericardial effusions are seen. Sp
francisco, liver, gallbladder, kidneys, adrenal glands, pancreas, stomach, large bowel are unremarkable. T
here is a small fat-containing umbilical hernia. The appendix is normal.

Within the pelvis there is a small amount of free air seen with a few scattered locules of air noted.
 There is mild circumferential bowel wall thickening involving the ileum right hemipelvis, and there 
is a small amount of free fluid as well as a 4.5 x 3.8 cm rim-enhancing fluid collection within the p
armando to the right of midline on image 75.



CONCLUSION:

1.  Scattered foci of free air in the lower abdomen and pelvis not unexpected given the recent hyster
ectomy.

2.  There is moderate stranding, a small amount of free fluid as well as a rim-enhancing fluid collec
tion in the right hemipelvis measuring up to 4.5 cm concerning for developing abscess.

3.  Reactive bowel wall thickening is seen involving a loop of ileum in the right hemipelvis. There i
s also mild circumferential bladder wall thickening.



Electronically signed by: Saulo Teran MD  6/9/2018 8:29 AM EDT

## 2018-06-10 VITALS
RESPIRATION RATE: 17 BRPM | OXYGEN SATURATION: 96 % | DIASTOLIC BLOOD PRESSURE: 68 MMHG | HEART RATE: 107 BPM | TEMPERATURE: 98.1 F | SYSTOLIC BLOOD PRESSURE: 127 MMHG

## 2018-06-10 VITALS
RESPIRATION RATE: 18 BRPM | HEART RATE: 84 BPM | SYSTOLIC BLOOD PRESSURE: 110 MMHG | OXYGEN SATURATION: 96 % | TEMPERATURE: 98.7 F | DIASTOLIC BLOOD PRESSURE: 75 MMHG

## 2018-06-10 VITALS
SYSTOLIC BLOOD PRESSURE: 113 MMHG | TEMPERATURE: 97.9 F | HEART RATE: 97 BPM | RESPIRATION RATE: 16 BRPM | OXYGEN SATURATION: 97 % | DIASTOLIC BLOOD PRESSURE: 67 MMHG

## 2018-06-10 VITALS
OXYGEN SATURATION: 98 % | RESPIRATION RATE: 16 BRPM | SYSTOLIC BLOOD PRESSURE: 118 MMHG | DIASTOLIC BLOOD PRESSURE: 55 MMHG | HEART RATE: 91 BPM | TEMPERATURE: 99 F

## 2018-06-10 VITALS
SYSTOLIC BLOOD PRESSURE: 114 MMHG | TEMPERATURE: 98.6 F | RESPIRATION RATE: 20 BRPM | DIASTOLIC BLOOD PRESSURE: 61 MMHG | HEART RATE: 110 BPM | OXYGEN SATURATION: 99 %

## 2018-06-10 LAB
BASOPHILS # BLD AUTO: 0.1 TH/MM3 (ref 0–0.2)
BASOPHILS NFR BLD: 0.3 % (ref 0–2)
EOSINOPHIL # BLD: 0.2 TH/MM3 (ref 0–0.4)
EOSINOPHIL NFR BLD: 0.9 % (ref 0–4)
ERYTHROCYTE [DISTWIDTH] IN BLOOD BY AUTOMATED COUNT: 14.1 % (ref 11.6–17.2)
HCT VFR BLD CALC: 31 % (ref 35–46)
HGB BLD-MCNC: 10.4 GM/DL (ref 11.6–15.3)
LYMPHOCYTES # BLD AUTO: 1.9 TH/MM3 (ref 1–4.8)
LYMPHOCYTES NFR BLD AUTO: 9.8 % (ref 9–44)
MCH RBC QN AUTO: 29 PG (ref 27–34)
MCHC RBC AUTO-ENTMCNC: 33.6 % (ref 32–36)
MCV RBC AUTO: 86.4 FL (ref 80–100)
MONOCYTE #: 0.7 TH/MM3 (ref 0–0.9)
MONOCYTES NFR BLD: 3.8 % (ref 0–8)
NEUTROPHILS # BLD AUTO: 16.2 TH/MM3 (ref 1.8–7.7)
NEUTROPHILS NFR BLD AUTO: 85.2 % (ref 16–70)
PLATELET # BLD: 323 TH/MM3 (ref 150–450)
PMV BLD AUTO: 7.2 FL (ref 7–11)
RBC # BLD AUTO: 3.59 MIL/MM3 (ref 4–5.3)
WBC # BLD AUTO: 19 TH/MM3 (ref 4–11)

## 2018-06-10 RX ADMIN — TAZOBACTAM SODIUM AND PIPERACILLIN SODIUM SCH MLS/HR: 500; 4 INJECTION, SOLUTION INTRAVENOUS at 00:04

## 2018-06-10 RX ADMIN — POLYETHYLENE GLYCOL 3350 SCH GM: 17 POWDER, FOR SOLUTION ORAL at 13:45

## 2018-06-10 RX ADMIN — DOCUSATE SODIUM SCH MG: 100 CAPSULE, LIQUID FILLED ORAL at 08:13

## 2018-06-10 RX ADMIN — TAZOBACTAM SODIUM AND PIPERACILLIN SODIUM SCH MLS/HR: 500; 4 INJECTION, SOLUTION INTRAVENOUS at 16:32

## 2018-06-10 RX ADMIN — HYDROCODONE BITARTRATE AND ACETAMINOPHEN PRN TAB: 5; 325 TABLET ORAL at 17:21

## 2018-06-10 RX ADMIN — DOCUSATE SODIUM SCH MG: 100 CAPSULE, LIQUID FILLED ORAL at 19:48

## 2018-06-10 RX ADMIN — HYDROCODONE BITARTRATE AND ACETAMINOPHEN PRN TAB: 5; 325 TABLET ORAL at 09:01

## 2018-06-10 RX ADMIN — OXYTOCIN SCH MLS/HR: 10 INJECTION, SOLUTION INTRAMUSCULAR; INTRAVENOUS at 08:15

## 2018-06-10 RX ADMIN — TAZOBACTAM SODIUM AND PIPERACILLIN SODIUM SCH MLS/HR: 500; 4 INJECTION, SOLUTION INTRAVENOUS at 08:13

## 2018-06-10 RX ADMIN — OXYTOCIN SCH MLS/HR: 10 INJECTION, SOLUTION INTRAMUSCULAR; INTRAVENOUS at 01:08

## 2018-06-10 RX ADMIN — ONDANSETRON PRN MG: 4 TABLET, ORALLY DISINTEGRATING ORAL at 17:21

## 2018-06-10 RX ADMIN — ONDANSETRON PRN MG: 4 TABLET, ORALLY DISINTEGRATING ORAL at 08:13

## 2018-06-10 RX ADMIN — OXYTOCIN SCH MLS/HR: 10 INJECTION, SOLUTION INTRAMUSCULAR; INTRAVENOUS at 16:32

## 2018-06-10 RX ADMIN — SODIUM CHLORIDE SCH MLS/HR: 900 INJECTION, SOLUTION INTRAVENOUS at 17:18

## 2018-06-10 RX ADMIN — Medication SCH ML: at 19:48

## 2018-06-10 RX ADMIN — Medication SCH ML: at 08:13

## 2018-06-10 NOTE — HHI.PR
Subjective


Remarks


POD 4  s/p robotic TLH and LSO 


CT suggestive of small right lower quadrant abcess


afebrile since admission on Zosyn


WBC still elevated


pain is diminished;  in RLQ


complaining of constipation


concerned about missing daily dose of klonopin





Objective


Vital Signs





Vital Signs








  Date Time  Temp Pulse Resp B/P (MAP) Pulse Ox O2 Delivery O2 Flow Rate FiO2


 


6/10/18 10:33 99.0 91 16 118/55 (76) 98   


 


6/10/18 10:04   20     


 


6/10/18 08:00 98.1 107 17 127/68 (87) 96   


 


6/10/18 00:00 98.6 110 20 114/61 (78) 99   


 


6/9/18 20:00 98.7 112 20 96/51 (66) 98   


 


6/9/18 16:00 99.0 90 18 114/81 (92) 96   


 


6/9/18 15:08   20     














I/O      


 


 6/9/18 6/9/18 6/9/18 6/10/18 6/10/18 6/10/18





 07:00 15:00 23:00 07:00 15:00 23:00


 


Intake Total  3100 ml  1000 ml  


 


Balance  3100 ml  1000 ml  


 


      


 


Intake IV Total  3100 ml  1000 ml  


 


# Voids  1 1 3  








Result Diagram:  


6/10/18 0657                                                                   

             6/9/18 0725





Objective Remarks


Chest is clear, regular rate and rhythm.


Abdomen is soft and diffusely tender without guarding; less than yesterday


Incisions clean and dry.


no perineal bleeding


Ext no CCE.





A/P


Assessment and Plan


POD 3





small abcess suggested on CT


will keep 48 hours on IV antibiotics and see if resolved without Lscope or IR


supportive care





POD 4





Better 


needs imaging for interval change


will order sonogram


miralax


order her 0.5 mg klonopin daily


notify Dr. Batista of her admission











Elisa Alvarez MD Shiv 10, 2018 13:30

## 2018-06-11 VITALS
OXYGEN SATURATION: 97 % | HEART RATE: 100 BPM | DIASTOLIC BLOOD PRESSURE: 76 MMHG | TEMPERATURE: 97.9 F | SYSTOLIC BLOOD PRESSURE: 122 MMHG | RESPIRATION RATE: 18 BRPM

## 2018-06-11 VITALS
HEART RATE: 92 BPM | RESPIRATION RATE: 16 BRPM | SYSTOLIC BLOOD PRESSURE: 118 MMHG | OXYGEN SATURATION: 98 % | DIASTOLIC BLOOD PRESSURE: 68 MMHG | TEMPERATURE: 98.3 F

## 2018-06-11 VITALS
HEART RATE: 101 BPM | DIASTOLIC BLOOD PRESSURE: 82 MMHG | SYSTOLIC BLOOD PRESSURE: 123 MMHG | RESPIRATION RATE: 18 BRPM | OXYGEN SATURATION: 98 % | TEMPERATURE: 98.2 F

## 2018-06-11 LAB
BASOPHILS # BLD AUTO: 0 TH/MM3 (ref 0–0.2)
BASOPHILS NFR BLD: 0.1 % (ref 0–2)
EOSINOPHIL # BLD: 0.2 TH/MM3 (ref 0–0.4)
EOSINOPHIL NFR BLD: 1.4 % (ref 0–4)
ERYTHROCYTE [DISTWIDTH] IN BLOOD BY AUTOMATED COUNT: 13.9 % (ref 11.6–17.2)
HCT VFR BLD CALC: 28.6 % (ref 35–46)
HGB BLD-MCNC: 9.6 GM/DL (ref 11.6–15.3)
LYMPHOCYTES # BLD AUTO: 1.4 TH/MM3 (ref 1–4.8)
LYMPHOCYTES NFR BLD AUTO: 10.6 % (ref 9–44)
MCH RBC QN AUTO: 28.8 PG (ref 27–34)
MCHC RBC AUTO-ENTMCNC: 33.7 % (ref 32–36)
MCV RBC AUTO: 85.5 FL (ref 80–100)
MONOCYTE #: 0.5 TH/MM3 (ref 0–0.9)
MONOCYTES NFR BLD: 3.8 % (ref 0–8)
NEUTROPHILS # BLD AUTO: 11.1 TH/MM3 (ref 1.8–7.7)
NEUTROPHILS NFR BLD AUTO: 84.1 % (ref 16–70)
PLATELET # BLD: 327 TH/MM3 (ref 150–450)
PMV BLD AUTO: 7.1 FL (ref 7–11)
RBC # BLD AUTO: 3.34 MIL/MM3 (ref 4–5.3)
WBC # BLD AUTO: 13.3 TH/MM3 (ref 4–11)

## 2018-06-11 RX ADMIN — OXYTOCIN SCH MLS/HR: 10 INJECTION, SOLUTION INTRAMUSCULAR; INTRAVENOUS at 00:13

## 2018-06-11 RX ADMIN — ONDANSETRON PRN MG: 4 TABLET, ORALLY DISINTEGRATING ORAL at 16:50

## 2018-06-11 RX ADMIN — TAZOBACTAM SODIUM AND PIPERACILLIN SODIUM SCH MLS/HR: 500; 4 INJECTION, SOLUTION INTRAVENOUS at 16:22

## 2018-06-11 RX ADMIN — POLYETHYLENE GLYCOL 3350 SCH GM: 17 POWDER, FOR SOLUTION ORAL at 08:47

## 2018-06-11 RX ADMIN — TAZOBACTAM SODIUM AND PIPERACILLIN SODIUM SCH MLS/HR: 500; 4 INJECTION, SOLUTION INTRAVENOUS at 00:08

## 2018-06-11 RX ADMIN — ONDANSETRON PRN MG: 4 TABLET, ORALLY DISINTEGRATING ORAL at 03:48

## 2018-06-11 RX ADMIN — SODIUM CHLORIDE SCH MLS/HR: 900 INJECTION, SOLUTION INTRAVENOUS at 16:22

## 2018-06-11 RX ADMIN — Medication SCH ML: at 08:47

## 2018-06-11 RX ADMIN — DOCUSATE SODIUM SCH MG: 100 CAPSULE, LIQUID FILLED ORAL at 08:47

## 2018-06-11 RX ADMIN — SODIUM CHLORIDE SCH MLS/HR: 900 INJECTION, SOLUTION INTRAVENOUS at 01:38

## 2018-06-11 RX ADMIN — TAZOBACTAM SODIUM AND PIPERACILLIN SODIUM SCH MLS/HR: 500; 4 INJECTION, SOLUTION INTRAVENOUS at 08:46

## 2018-06-11 RX ADMIN — HYDROCODONE BITARTRATE AND ACETAMINOPHEN PRN TAB: 5; 325 TABLET ORAL at 16:53

## 2018-06-11 RX ADMIN — OXYTOCIN SCH MLS/HR: 10 INJECTION, SOLUTION INTRAMUSCULAR; INTRAVENOUS at 10:32

## 2018-06-11 RX ADMIN — SODIUM CHLORIDE SCH MLS/HR: 900 INJECTION, SOLUTION INTRAVENOUS at 08:47

## 2018-06-11 NOTE — HHI.DCPOC
Discharge Care Plan








Your Health Problems Are: Abdominal pain





 Fever, temperature>100.4





 Nausea and/or vomiting





 Vaginal bleeding








Report Symptoms to Your Doctor


-Temperature above 100.5 degrees


-Redness, of incision or excessive or foul smelling drainage


-Unusual pain or calf pain


-Increased vaginal bleeding


-Painful or difficulty urinating


-Feelings of extreme sadness or anxiety after 2 weeks


Goals to Promote Your Health


* To prevent worsening of your condition and complications


* To maintain your health at the optimal level


Directions to Meet Your Goals


*** Take your medications as prescribed


*** Follow your dietary instruction


*** Follow activity as directed


*** Ensure plenty of rest for recovery


*** Drink fluids for hydration








*** Keep your appointments as scheduled


*** Take your immunizations and boosters as scheduled


*** If your symptoms worsen call your PCP, if no PCP go to Urgent Care Center 

or Emergency Room***


*** Smoking is Dangerous to Your Health. Avoid second hand smoke***


***Call the 24-hour crisis hotline for domestic abuse at 1-616.680.9206***











Gerard Batista MD Jun 11, 2018 16:51

## 2018-06-11 NOTE — HHI.PR
Subjective


Remarks


POD#5; readmitted with small pelvic abscess,right sided ;Doing well, pain is 

well controlled, eating well.Denies fever/chills





Objective


Vital Signs





Vital Signs








  Date Time  Temp Pulse Resp B/P (MAP) Pulse Ox O2 Delivery O2 Flow Rate FiO2


 


6/11/18 12:00 98.2 101 18 123/82 (96) 98   


 


6/11/18 08:00 97.9 100 18 122/76 (91) 97   


 


6/11/18 00:28 98.3 92 16 118/68 (85) 98   


 


6/10/18 20:26 98.7 84 18 110/75 (87) 96   


 


6/10/18 18:35   20     














I/O      


 


 6/10/18 6/10/18 6/10/18 6/11/18 6/11/18 6/11/18





 07:00 15:00 23:00 07:00 15:00 23:00


 


Intake Total 1000 ml  440 ml 1880 ml 1200 ml 


 


Balance 1000 ml  440 ml 1880 ml 1200 ml 


 


      


 


Intake Oral   440 ml 780 ml  


 


IV Total 1000 ml   1100 ml 1200 ml 


 


# Voids 3  6 4  


 


# Bowel Movements   1   








Result Diagram:  


6/11/18 0806                                                                   

             6/9/18 0725





Objective Remarks


Chest is clear, regular rate and rhythm.


Abdomen is soft and diffusely tender without guarding; less than yesterday


Incisions clean and dry.


no perineal bleeding


Ext no CCE.





A/P


Assessment and Plan


POD#5; S/P robotic asst. TLH/LSO; RE-ADMITTED WITH RIGHT PELVIC ABSCESS,

RESOLVING,


afebrile, decreasing WBC, cultures are negative. 


Plan to discharge home on CIPRO 500 mg bid x 10 d,FLAGYL 500 MG tid X 5 d.


will return to office this friday











Gerard Batista MD Jun 11, 2018 16:59

## 2018-06-11 NOTE — RADRPT
EXAM DATE:  6/11/2018 11:05 AM EDT

AGE/SEX:        37 years / Female



INDICATIONS:  Adnexal abscess post hysterectomy. 



CLINICAL DATA:  This is the patient's initial encounter. Patient reports that signs and symptoms have
 been present for 2 days and indicates a pain score of 3/10. 

                                                                          

MEDICAL/SURGICAL HISTORY:       . Endometriosis.   Cholecystectomy. Hysterectomy 6/6/18.



COMPARISON:      [facility] – [place], [place] ABDOMEN & PELVIS W CONTRAST, 6/9/2018.  . 





MEASUREMENTS:

Uterus:__Surgically removed 6/6/18

Endometrial Stripe:__N/A

Right Ovary:__ 3.2 x 2.1 x 2.7 cm

Left Ovary:__ N/A



FINDINGS:

Uterus:  Surgically absent.  

Right Ovary:  Within the right adnexa there is a fluid collection that measures 3.1 x 2.9 x 2.3 cm wh
ich is stable to minimally smaller from the prior CT. The right ovary is normal.

Left Ovary:  Bowel obscures the left adnexa.

Other: A trace amount of free fluid posteriorly within the midline.



CONCLUSION: 

1.  3.1 x 2.9 x 2.3 cm fluid collection within the right adnexa is stable to minimally smaller from t
he prior CT.

2.  Prior hysterectomy.



Electronically signed by: Win Milton MD  6/11/2018 11:26 AM EDT

## 2023-02-21 ENCOUNTER — APPOINTMENT (RX ONLY)
Dept: URBAN - METROPOLITAN AREA CLINIC 52 | Facility: CLINIC | Age: 43
Setting detail: DERMATOLOGY
End: 2023-02-21

## 2023-02-21 DIAGNOSIS — L98.8 OTHER SPECIFIED DISORDERS OF THE SKIN AND SUBCUTANEOUS TISSUE: ICD-10-CM

## 2023-02-21 DIAGNOSIS — L72.0 EPIDERMAL CYST: ICD-10-CM | Status: INADEQUATELY CONTROLLED

## 2023-02-21 DIAGNOSIS — Z41.9 ENCOUNTER FOR PROCEDURE FOR PURPOSES OTHER THAN REMEDYING HEALTH STATE, UNSPECIFIED: ICD-10-CM

## 2023-02-21 DIAGNOSIS — L40.0 PSORIASIS VULGARIS: ICD-10-CM | Status: WORSENING

## 2023-02-21 PROCEDURE — ? COSMETIC CONSULTATION: HYDRAFACIAL

## 2023-02-21 PROCEDURE — ? PRESCRIPTION

## 2023-02-21 PROCEDURE — 99204 OFFICE O/P NEW MOD 45 MIN: CPT

## 2023-02-21 PROCEDURE — ? COUNSELING

## 2023-02-21 PROCEDURE — ? ADDITIONAL NOTES

## 2023-02-21 PROCEDURE — ? PRESCRIPTION MEDICATION MANAGEMENT

## 2023-02-21 RX ORDER — BETAMETHASONE DIPROPIONATE 0.5 MG/G
CREAM TOPICAL BID
Qty: 45 | Refills: 2 | Status: ERX | COMMUNITY
Start: 2023-02-21

## 2023-02-21 RX ADMIN — BETAMETHASONE DIPROPIONATE 1: 0.5 CREAM TOPICAL at 00:00

## 2023-02-21 ASSESSMENT — LOCATION SIMPLE DESCRIPTION DERM
LOCATION SIMPLE: RIGHT ELBOW
LOCATION SIMPLE: LEFT CHEEK
LOCATION SIMPLE: RIGHT ANKLE
LOCATION SIMPLE: RIGHT LIP
LOCATION SIMPLE: LEFT POSTERIOR UPPER ARM
LOCATION SIMPLE: RIGHT CHEEK
LOCATION SIMPLE: LEFT LIP

## 2023-02-21 ASSESSMENT — LOCATION ZONE DERM
LOCATION ZONE: ARM
LOCATION ZONE: FACE
LOCATION ZONE: LIP
LOCATION ZONE: LEG

## 2023-02-21 ASSESSMENT — PGA PSORIASIS: PGA PSORIASIS 2020: MODERATE

## 2023-02-21 ASSESSMENT — ITCH NUMERIC RATING SCALE: HOW SEVERE IS YOUR ITCHING?: 5

## 2023-02-21 ASSESSMENT — LOCATION DETAILED DESCRIPTION DERM
LOCATION DETAILED: LEFT CENTRAL MALAR CHEEK
LOCATION DETAILED: RIGHT CENTRAL MALAR CHEEK
LOCATION DETAILED: LEFT DISTAL POSTERIOR UPPER ARM
LOCATION DETAILED: RIGHT ELBOW
LOCATION DETAILED: RIGHT UPPER CUTANEOUS LIP
LOCATION DETAILED: LEFT UPPER CUTANEOUS LIP
LOCATION DETAILED: RIGHT ANKLE

## 2023-02-21 ASSESSMENT — BSA PSORIASIS: % BODY COVERED IN PSORIASIS: 11

## 2023-02-21 NOTE — PROCEDURE: ADDITIONAL NOTES
Detail Level: Simple
Additional Notes: Discussed fillers for vertical lip lines
Render Risk Assessment In Note?: no

## 2023-02-21 NOTE — PROCEDURE: PRESCRIPTION MEDICATION MANAGEMENT
Render In Strict Bullet Format?: No
Detail Level: Zone
Initiate Treatment: betamethasone dipropionate 0.05 % topical cream BID\\nQuantity: 45.0 g  Days Supply: 30\\nSig: AAA on tops of feet and elbows BID x 2-3 weeks. Avoid on face and groin .

## 2023-03-13 ENCOUNTER — APPOINTMENT (RX ONLY)
Dept: URBAN - METROPOLITAN AREA CLINIC 52 | Facility: CLINIC | Age: 43
Setting detail: DERMATOLOGY
End: 2023-03-13

## 2023-03-13 DIAGNOSIS — Z41.9 ENCOUNTER FOR PROCEDURE FOR PURPOSES OTHER THAN REMEDYING HEALTH STATE, UNSPECIFIED: ICD-10-CM

## 2023-03-13 PROCEDURE — ? HYDRAFACIAL

## 2023-03-13 ASSESSMENT — LOCATION SIMPLE DESCRIPTION DERM
LOCATION SIMPLE: SUPERIOR FOREHEAD
LOCATION SIMPLE: NOSE
LOCATION SIMPLE: RIGHT CHEEK
LOCATION SIMPLE: LEFT CHEEK
LOCATION SIMPLE: CHIN

## 2023-03-13 ASSESSMENT — LOCATION DETAILED DESCRIPTION DERM
LOCATION DETAILED: LEFT CHIN
LOCATION DETAILED: RIGHT INFERIOR CENTRAL MALAR CHEEK
LOCATION DETAILED: LEFT INFERIOR CENTRAL MALAR CHEEK
LOCATION DETAILED: NASAL SUPRATIP
LOCATION DETAILED: SUPERIOR MID FOREHEAD

## 2023-03-13 ASSESSMENT — LOCATION ZONE DERM
LOCATION ZONE: FACE
LOCATION ZONE: NOSE

## 2023-03-13 NOTE — PROCEDURE: HYDRAFACIAL
Vacuum Pressure High Setting (Will Not Render If Set To 0): 0
Number Of Passes: 1
Tip: Hydropeel Tip, Blue
Solution: GlySal 15%
Tip: Hydropeel Tip, Clear
Number Of Passes: 2
Solution Override
Procedure: Exfoliation
Location: face
Tip Override
Vacuum Pressure Low Setting (Will Not Render If Set To 0): 16
Procedure: Extraction
Procedure: Extend and Protect
Solution: Activ-4
Vacuum Pressure Low Setting (Will Not Render If Set To 0): 22
Solution: Antiox-6
Vacuum Pressure High Setting (Will Not Render If Set To 0): 14
Procedure: Boost
Tip: Hydropeel Tip, Teal
Price (Use Numbers Only, No Special Characters Or $): 199
Consent: Written consent obtained, risks reviewed including but not limited to crusting, scabbing, blistering, scarring, darker or lighter pigmentary change, bruising, and/or incomplete response.
Solution: Beta-HD
Procedure: Fusion
Vacuum Pressure Low Setting (Will Not Render If Set To 0): 13
Procedure: Peel
Post-Care Instructions: I reviewed with the patient in detail post-care instructions. Patient should stay away from the sun and wear sun protection until treated areas are fully healed.
Indication: acne
Stroke (Optional): some manual extractions needed

## 2023-03-15 ENCOUNTER — RX ONLY (OUTPATIENT)
Age: 43
Setting detail: RX ONLY
End: 2023-03-15

## 2023-03-15 RX ORDER — TRETIONIN 0.25 MG/G
CREAM TOPICAL
Qty: 20 | Refills: 0 | COMMUNITY
Start: 2023-03-15

## 2023-04-18 ENCOUNTER — APPOINTMENT (RX ONLY)
Dept: URBAN - METROPOLITAN AREA CLINIC 52 | Facility: CLINIC | Age: 43
Setting detail: DERMATOLOGY
End: 2023-04-18

## 2023-04-18 DIAGNOSIS — L40.0 PSORIASIS VULGARIS: ICD-10-CM | Status: WORSENING

## 2023-04-18 DIAGNOSIS — L29.89 OTHER PRURITUS: ICD-10-CM

## 2023-04-18 DIAGNOSIS — L29.8 OTHER PRURITUS: ICD-10-CM

## 2023-04-18 DIAGNOSIS — Z41.9 ENCOUNTER FOR PROCEDURE FOR PURPOSES OTHER THAN REMEDYING HEALTH STATE, UNSPECIFIED: ICD-10-CM

## 2023-04-18 PROCEDURE — 99214 OFFICE O/P EST MOD 30 MIN: CPT | Mod: 25

## 2023-04-18 PROCEDURE — 96372 THER/PROPH/DIAG INJ SC/IM: CPT

## 2023-04-18 PROCEDURE — ? ADDITIONAL NOTES

## 2023-04-18 PROCEDURE — ? COSMETIC CONSULTATION: FILLERS

## 2023-04-18 PROCEDURE — ? INTRAMUSCULAR KENALOG

## 2023-04-18 PROCEDURE — ? PRESCRIPTION MEDICATION MANAGEMENT

## 2023-04-18 PROCEDURE — ? COSMETIC CONSULTATION: BOTOX

## 2023-04-18 PROCEDURE — ? PRESCRIPTION

## 2023-04-18 PROCEDURE — ? COUNSELING

## 2023-04-18 PROCEDURE — ? ORDER TESTS

## 2023-04-18 ASSESSMENT — LOCATION ZONE DERM
LOCATION ZONE: LIP
LOCATION ZONE: FACE
LOCATION ZONE: LEG
LOCATION ZONE: ARM
LOCATION ZONE: TRUNK

## 2023-04-18 ASSESSMENT — LOCATION SIMPLE DESCRIPTION DERM
LOCATION SIMPLE: RIGHT ANKLE
LOCATION SIMPLE: RIGHT LIP
LOCATION SIMPLE: RIGHT CHEEK
LOCATION SIMPLE: RIGHT LOWER BACK
LOCATION SIMPLE: SUPERIOR FOREHEAD
LOCATION SIMPLE: RIGHT ELBOW
LOCATION SIMPLE: LEFT ANKLE
LOCATION SIMPLE: LEFT POSTERIOR UPPER ARM
LOCATION SIMPLE: GLABELLA
LOCATION SIMPLE: LEFT LIP

## 2023-04-18 ASSESSMENT — LOCATION DETAILED DESCRIPTION DERM
LOCATION DETAILED: RIGHT INFERIOR MEDIAL MALAR CHEEK
LOCATION DETAILED: RIGHT UPPER CUTANEOUS LIP
LOCATION DETAILED: RIGHT ELBOW
LOCATION DETAILED: GLABELLA
LOCATION DETAILED: RIGHT INFERIOR LATERAL LOWER BACK
LOCATION DETAILED: SUPERIOR MID FOREHEAD
LOCATION DETAILED: LEFT UPPER CUTANEOUS LIP
LOCATION DETAILED: LEFT DISTAL POSTERIOR UPPER ARM
LOCATION DETAILED: LEFT ANKLE
LOCATION DETAILED: RIGHT ANKLE

## 2023-04-18 ASSESSMENT — ITCH NUMERIC RATING SCALE: HOW SEVERE IS YOUR ITCHING?: 6

## 2023-04-18 ASSESSMENT — BSA PSORIASIS: % BODY COVERED IN PSORIASIS: 17

## 2023-04-18 NOTE — PROCEDURE: PRESCRIPTION MEDICATION MANAGEMENT
Render In Strict Bullet Format?: No
Detail Level: Zone
Plan: Have labs done if they come back normal start skyrizi
Continue Regimen: betamethasone dipropionate 0.05 % topical cream BID\\nQuantity: 45.0 g  Days Supply: 30\\nSig: AAA on tops of feet and elbows BID x 2-3 weeks. Avoid on face and groin .

## 2023-04-18 NOTE — PROCEDURE: ORDER TESTS
Expected Date Of Service: 04/18/2023
Lab Facility: [id number]
Performing Laboratory: [id number]
Bill For Surgical Tray: no
Billing Type: Third-Party Bill

## 2023-04-18 NOTE — PROCEDURE: ADDITIONAL NOTES
Render Risk Assessment In Note?: no
Detail Level: Simple
Additional Notes: Quoted $390 for Botox on glabella and forehead areas.

## 2023-04-23 RX ORDER — RISANKIZUMAB-RZAA 75 MG/0.83
1 KIT SUBCUTANEOUS
Qty: 2 | Refills: 3 | Status: ACTIVE